# Patient Record
Sex: MALE
[De-identification: names, ages, dates, MRNs, and addresses within clinical notes are randomized per-mention and may not be internally consistent; named-entity substitution may affect disease eponyms.]

---

## 2019-08-19 LAB
ANION GAP SERPL CALC-SCNC: 11.4 MMOL/L (ref 8–16)
BASOPHILS # BLD AUTO: 0.1 10*3/UL (ref 0–0.1)
BASOPHILS NFR BLD AUTO: 0.9 % (ref 0–1)
BUN SERPL-MCNC: 8 MG/DL (ref 7–26)
BUN/CREAT SERPL: 11 (ref 6–25)
CALCIUM SERPL-MCNC: 9.1 MG/DL (ref 8.4–10.2)
CHLORIDE SERPL-SCNC: 105 MMOL/L (ref 98–107)
CO2 SERPL-SCNC: 29 MMOL/L (ref 22–29)
DEPRECATED NEUTROPHILS # BLD AUTO: 6 10*3/UL (ref 2.1–6.9)
EGFRCR SERPLBLD CKD-EPI 2021: > 60 ML/MIN (ref 60–?)
EOSINOPHIL # BLD AUTO: 0.5 10*3/UL (ref 0–0.4)
EOSINOPHIL NFR BLD AUTO: 6.4 % (ref 0–6)
ERYTHROCYTE [DISTWIDTH] IN CORD BLOOD: 14.6 % (ref 11.7–14.4)
GLUCOSE SERPLBLD-MCNC: 96 MG/DL (ref 74–118)
HCT VFR BLD AUTO: 39.6 % (ref 38.2–49.6)
HGB BLD-MCNC: 12.7 G/DL (ref 14–18)
LYMPHOCYTES # BLD: 0.9 10*3/UL (ref 1–3.2)
LYMPHOCYTES NFR BLD AUTO: 11.4 % (ref 18–39.1)
MCH RBC QN AUTO: 26.3 PG (ref 28–32)
MCHC RBC AUTO-ENTMCNC: 32.1 G/DL (ref 31–35)
MCV RBC AUTO: 82.2 FL (ref 81–99)
MONOCYTES # BLD AUTO: 0.5 10*3/UL (ref 0.2–0.8)
MONOCYTES NFR BLD AUTO: 6.6 % (ref 4.4–11.3)
NEUTS SEG NFR BLD AUTO: 74.5 % (ref 38.7–80)
PLATELET # BLD AUTO: 256 X10E3/UL (ref 140–360)
POTASSIUM SERPL-SCNC: 3.4 MMOL/L (ref 3.5–5.1)
RBC # BLD AUTO: 4.82 X10E6/UL (ref 4.3–5.7)
SODIUM SERPL-SCNC: 142 MMOL/L (ref 136–145)

## 2019-08-19 NOTE — DIAGNOSTIC IMAGING REPORT
Chest, 2 views,  8/19/2019.   

 



History: Preop, knee surgery.



Comparison: None available.



Findings: The cardiomediastinal silhouette and pulmonary vasculature are within

normal limits. Linear opacities are present in the right upper lobe. The lungs

are otherwise clear without evidence of consolidation or pleural effusion.

Degenerative changes are present at the thoracic spine. Gastric lap band is

noted. There are no acute osseous or soft tissue abnormalities. 



Impression: 

Right upper lobe linear scarring versus atelectasis.



Signed by: Israel Obando on 8/19/2019 4:56 PM

## 2019-08-20 ENCOUNTER — HOSPITAL ENCOUNTER (OUTPATIENT)
Dept: HOSPITAL 88 - OR | Age: 59
Discharge: HOME | End: 2019-08-20
Attending: SPECIALIST
Payer: COMMERCIAL

## 2019-08-20 VITALS — SYSTOLIC BLOOD PRESSURE: 139 MMHG | DIASTOLIC BLOOD PRESSURE: 87 MMHG

## 2019-08-20 DIAGNOSIS — K21.9: ICD-10-CM

## 2019-08-20 DIAGNOSIS — Z87.891: ICD-10-CM

## 2019-08-20 DIAGNOSIS — S83.221A: Primary | ICD-10-CM

## 2019-08-20 DIAGNOSIS — Z01.810: ICD-10-CM

## 2019-08-20 DIAGNOSIS — J44.9: ICD-10-CM

## 2019-08-20 DIAGNOSIS — M17.11: ICD-10-CM

## 2019-08-20 DIAGNOSIS — R00.1: ICD-10-CM

## 2019-08-20 DIAGNOSIS — M22.41: ICD-10-CM

## 2019-08-20 DIAGNOSIS — Z01.818: ICD-10-CM

## 2019-08-20 DIAGNOSIS — I10: ICD-10-CM

## 2019-08-20 DIAGNOSIS — E03.9: ICD-10-CM

## 2019-08-20 DIAGNOSIS — Z01.812: ICD-10-CM
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
## 2019-08-20 PROCEDURE — 29881 ARTHRS KNE SRG MNISECTMY M/L: CPT

## 2019-08-20 PROCEDURE — 85025 COMPLETE CBC W/AUTO DIFF WBC: CPT

## 2019-08-20 PROCEDURE — 71046 X-RAY EXAM CHEST 2 VIEWS: CPT

## 2019-08-20 PROCEDURE — 93005 ELECTROCARDIOGRAM TRACING: CPT

## 2019-08-20 PROCEDURE — 80048 BASIC METABOLIC PNL TOTAL CA: CPT

## 2019-08-20 PROCEDURE — 36415 COLL VENOUS BLD VENIPUNCTURE: CPT

## 2019-08-20 NOTE — XMS REPORT
Patient Summary Document

                             Created on: 2019



VITO PALACIO

External Reference #: [id number]

: 1960

Sex: Male



Demographics







                          Address                   [address] [place] [address]

 

                          Home Phone                [phone]

 

                          Preferred Language        Unknown

 

                          Marital Status            Unknown

 

                          [employer] Affiliation     Unknown

 

                          Race                      Unknown

 

                                        Additional Race(s)  

 

                          Ethnic Group              Unknown





Author







                          Author                    [facility]

 

                          Address                   Unknown

 

                          Phone                     Unavailable







Care Team Providers







                    Care Team Member Name    Role                Phone

 

                    JOSE C STEPHENSON    Unavailable         Unavailable







Problems

This patient has no known problems.



Allergies, Adverse Reactions, Alerts

This patient has no known allergies or adverse reactions.



Medications

This patient has no known medications.



Results







           Test Description    Test Time    Test Comments    Text Results    Atomic Results    Result

 Comments

 

                CHEST 2 VIEWS    2019 16:55:00                                                             

                                             [facility]  
                     [address] [address]  
   Patient Name: VITO PALACIO                                   MR #: 
[id number]                     : 1960                                  
Age/Sex: 59/M  Acct #: [id number]                              Req #: [id number]
[id number]  Adm Physician:                                                      
Ordered by: JOSE C STEPHENSON MD                            Report #: [id number] 
      Location: OR                                      Room/Bed:               
     
___________________________________________________________________________________________________
   Procedure: [id number] DX/CHEST 2 VIEWS  Exam Date: 19                   
        Exam Time: 1600                                              REPORT 
STATUS: Signed    Chest, 2 views,  2019.             History: Preop, knee 
surgery.      Comparison: None available.      Findings: The cardiomediastinal 
silhouette and pulmonary vasculature are within   normal limits. Linear 
opacities are present in the right upper lobe. The lungs   are otherwise clear 
without evidence of consolidation or pleural effusion.   Degenerative changes 
are present at the thoracic spine. Gastric lap band is   noted. There are no 
acute osseous or soft tissue abnormalities.       Impression:    Right upper 
lobe linear scarring versus atelectasis.      Signed by: Temo Obando on 
2019 4:56 PM        Dictated By: TEMO OBANDO MD  Electronically Signed 
By: TEMO OBANDO MD on 19  Transcribed By: MARKIE on 19 
     COPY TO:   JOSE C STEPHENSON MD

## 2019-08-28 NOTE — OPERATIVE REPORT
DATE OF PROCEDURE:  08/20/2019

 

SURGEON:  Jaquan Aceves MD

 

PREOPERATIVE DIAGNOSES:  Right knee medial meniscus tear, right knee degenerative joint

disease of the knee. 

 

POSTOPERATIVE DIAGNOSES:  Right knee medial meniscus tear, right knee degenerative joint

disease of the knee. 

 

OPERATIONS AND PROCEDURE PERFORMED:  The patient underwent a right knee examination

under anesthesia, right knee arthroscopy, right knee partial medial meniscectomy, right

knee chondroplasty of the patella, the trochlea of the medial femoral condyle, the

medial tibial plateau, the lateral femoral condyle and lateral tibial plateau. 

 

ASSISTANT:  Renuka Da Silva.

 

ANESTHESIA:  General endotracheal intubation anesthesia.

 

IV FLUIDS:  Per the anesthesia record.

 

BRIEF DESCRIPTION OF THE PATIENT'S OPERATIVE PROCEDURE:  Mr. Nava was taken to the

operating room, placed in the supine position on the operating table.  Following

induction of general anesthesia as well as endotracheal intubation, the patient's right

lower extremity was examined under anesthesia.  He was found have a mild effusion within

the knee joints, but otherwise ligamentously stable knee.  The patient's lower extremity

was prepped and draped in standard surgical fashion.  A two-port technique was used to

provide this patient arthroscopic evaluation of the knee joint.  Examination of the

suprapatellar pouch and medial lateral gutters found no evidence of loose bodies.  There

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  The scope

was then advanced into the medial compartment and examination of the medial compartment

demonstrated a torn medial meniscus.  There was also chondromalacia of the articulating

surfaces.  A combination of biting forceps and a motorized shaver used to resect the

torn portion of meniscus.  Chondroplasty of the medial femoral condyle and medial tibial

plateau were performed at this time.  The scope was then advanced to the intercondylar

notch and the anterior cruciate ligament was identified and found to be intact.  Scope

was then advanced into the lateral compartment and chondromalacia of the articulating

surfaces were encountered.  A chondroplasty of the lateral femoral condyle and lateral

tibial plateau were performed at this time.  The scope was then placed in the

suprapatellar pouch and chondroplasties of the patellar and trochlear were performed.

The knee was deflated with sterile normal saline.  Each of the portal sites were closed

using 4-0 nylon suture.  The portal sites as well as the knee itself were injected with

0.5% Marcaine with epinephrine.  Sterile dressings were applied.  The patient was

awakened, taken to the postanesthesia care unit in stable condition. 

 

 

 

 

______________________________

MD EMMANUEL Anderson/TK

D:  08/28/2019 17:45:18

T:  08/28/2019 18:24:43

Job #:  [id number]

## 2019-09-30 ENCOUNTER — HOSPITAL ENCOUNTER (OUTPATIENT)
Dept: HOSPITAL 88 - PT | Age: 59
End: 2019-09-30
Attending: SPECIALIST
Payer: COMMERCIAL

## 2019-09-30 DIAGNOSIS — R26.81: ICD-10-CM

## 2019-09-30 DIAGNOSIS — M25.661: ICD-10-CM

## 2019-09-30 DIAGNOSIS — M62.81: ICD-10-CM

## 2019-09-30 DIAGNOSIS — S83.241A: Primary | ICD-10-CM

## 2022-11-22 ENCOUNTER — HOSPITAL ENCOUNTER (EMERGENCY)
Dept: HOSPITAL 88 - ER | Age: 62
Discharge: HOME | End: 2022-11-22
Payer: SELF-PAY

## 2022-11-22 VITALS — SYSTOLIC BLOOD PRESSURE: 123 MMHG | DIASTOLIC BLOOD PRESSURE: 85 MMHG

## 2022-11-22 VITALS — HEIGHT: 68 IN | WEIGHT: 215 LBS | BODY MASS INDEX: 32.58 KG/M2

## 2022-11-22 DIAGNOSIS — N13.2: ICD-10-CM

## 2022-11-22 DIAGNOSIS — Z98.84: ICD-10-CM

## 2022-11-22 DIAGNOSIS — Z20.822: ICD-10-CM

## 2022-11-22 DIAGNOSIS — R10.31: Primary | ICD-10-CM

## 2022-11-22 DIAGNOSIS — K57.90: ICD-10-CM

## 2022-11-22 DIAGNOSIS — R11.2: ICD-10-CM

## 2022-11-22 DIAGNOSIS — K80.20: ICD-10-CM

## 2022-11-22 DIAGNOSIS — I10: ICD-10-CM

## 2022-11-22 DIAGNOSIS — J44.9: ICD-10-CM

## 2022-11-22 DIAGNOSIS — R94.31: ICD-10-CM

## 2022-11-22 DIAGNOSIS — E03.9: ICD-10-CM

## 2022-11-22 LAB
ALBUMIN SERPL-MCNC: 4.2 G/DL (ref 3.5–5)
ALBUMIN/GLOB SERPL: 1.4 {RATIO} (ref 0.8–2)
ALP SERPL-CCNC: 66 IU/L (ref 40–150)
ALT SERPL-CCNC: 10 IU/L (ref 0–55)
AMPHETAMINES UR QL SCN>1000 NG/ML: NEGATIVE
ANION GAP SERPL CALC-SCNC: 14.2 MMOL/L (ref 8–16)
BACTERIA URNS QL MICRO: (no result) /HPF
BASOPHILS # BLD AUTO: 0.1 10*3/UL (ref 0–0.1)
BASOPHILS NFR BLD AUTO: 0.8 % (ref 0–1)
BENZODIAZ UR QL SCN: NEGATIVE
BUN SERPL-MCNC: 13 MG/DL (ref 7–26)
BUN/CREAT SERPL: 12 (ref 6–25)
CALCIUM SERPL-MCNC: 9 MG/DL (ref 8.4–10.2)
CHLORIDE SERPL-SCNC: 104 MMOL/L (ref 98–107)
CK MB SERPL-MCNC: 1 NG/ML (ref 0–5)
CK SERPL-CCNC: 37 IU/L (ref 30–200)
CLARITY UR: (no result)
CO2 SERPL-SCNC: 30 MMOL/L (ref 22–29)
COLOR UR: YELLOW
DEPRECATED NEUTROPHILS # BLD AUTO: 9.6 10*3/UL (ref 2.1–6.9)
DEPRECATED RBC URNS MANUAL-ACNC: (no result) /HPF (ref 0–5)
EOSINOPHIL # BLD AUTO: 1 10*3/UL (ref 0–0.4)
EOSINOPHIL NFR BLD AUTO: 7.7 % (ref 0–6)
EPI CELLS URNS QL MICRO: (no result) /LPF
ERYTHROCYTE [DISTWIDTH] IN CORD BLOOD: 14.6 % (ref 11.7–14.4)
GLOBULIN PLAS-MCNC: 2.9 G/DL (ref 2.3–3.5)
GLUCOSE SERPLBLD-MCNC: 121 MG/DL (ref 74–118)
HCT VFR BLD AUTO: 51.8 % (ref 38.2–49.6)
HGB BLD-MCNC: 16.4 G/DL (ref 14–18)
KETONES UR QL STRIP.AUTO: (no result)
LEUKOCYTE ESTERASE UR QL STRIP.AUTO: NEGATIVE
LYMPHOCYTES # BLD: 1.7 10*3/UL (ref 1–3.2)
LYMPHOCYTES NFR BLD AUTO: 12.8 % (ref 18–39.1)
MAGNESIUM SERPL-MCNC: 2.1 MG/DL (ref 1.3–2.1)
MCH RBC QN AUTO: 28.4 PG (ref 28–32)
MCHC RBC AUTO-ENTMCNC: 31.7 G/DL (ref 31–35)
MCV RBC AUTO: 89.6 FL (ref 81–99)
MONOCYTES # BLD AUTO: 0.8 10*3/UL (ref 0.2–0.8)
MONOCYTES NFR BLD AUTO: 6.1 % (ref 4.4–11.3)
NEUTS SEG NFR BLD AUTO: 72 % (ref 38.7–80)
NITRITE UR QL STRIP.AUTO: NEGATIVE
PCP UR QL SCN: NEGATIVE
PLATELET # BLD AUTO: 293 X10E3/UL (ref 140–360)
POTASSIUM SERPL-SCNC: 3.2 MMOL/L (ref 3.5–5.1)
PROT UR QL STRIP.AUTO: NEGATIVE
RBC # BLD AUTO: 5.78 X10E6/UL (ref 4.3–5.7)
SODIUM SERPL-SCNC: 145 MMOL/L (ref 136–145)
SP GR UR STRIP: >=1.03 (ref 1.01–1.02)
UROBILINOGEN UR STRIP-MCNC: 0.2 MG/DL (ref 0.2–1)
WBC #/AREA URNS HPF: (no result) /HPF (ref 0–5)

## 2022-11-22 PROCEDURE — 82553 CREATINE MB FRACTION: CPT

## 2022-11-22 PROCEDURE — 83735 ASSAY OF MAGNESIUM: CPT

## 2022-11-22 PROCEDURE — 80320 DRUG SCREEN QUANTALCOHOLS: CPT

## 2022-11-22 PROCEDURE — 84484 ASSAY OF TROPONIN QUANT: CPT

## 2022-11-22 PROCEDURE — 87086 URINE CULTURE/COLONY COUNT: CPT

## 2022-11-22 PROCEDURE — 81001 URINALYSIS AUTO W/SCOPE: CPT

## 2022-11-22 PROCEDURE — 82550 ASSAY OF CK (CPK): CPT

## 2022-11-22 PROCEDURE — 99284 EMERGENCY DEPT VISIT MOD MDM: CPT

## 2022-11-22 PROCEDURE — 80307 DRUG TEST PRSMV CHEM ANLYZR: CPT

## 2022-11-22 PROCEDURE — 36415 COLL VENOUS BLD VENIPUNCTURE: CPT

## 2022-11-22 PROCEDURE — 71045 X-RAY EXAM CHEST 1 VIEW: CPT

## 2022-11-22 PROCEDURE — 74176 CT ABD & PELVIS W/O CONTRAST: CPT

## 2022-11-22 PROCEDURE — 93005 ELECTROCARDIOGRAM TRACING: CPT

## 2022-11-22 PROCEDURE — 80053 COMPREHEN METABOLIC PANEL: CPT

## 2022-11-22 PROCEDURE — 85025 COMPLETE CBC W/AUTO DIFF WBC: CPT
